# Patient Record
Sex: FEMALE | Race: BLACK OR AFRICAN AMERICAN | NOT HISPANIC OR LATINO | URBAN - METROPOLITAN AREA
[De-identification: names, ages, dates, MRNs, and addresses within clinical notes are randomized per-mention and may not be internally consistent; named-entity substitution may affect disease eponyms.]

---

## 2017-02-15 ENCOUNTER — INPATIENT (INPATIENT)
Facility: HOSPITAL | Age: 72
LOS: 1 days | Discharge: HOME | DRG: 248 | End: 2017-02-17
Attending: INTERNAL MEDICINE | Admitting: INTERNAL MEDICINE
Payer: MEDICARE

## 2017-02-15 VITALS
SYSTOLIC BLOOD PRESSURE: 256 MMHG | TEMPERATURE: 98 F | DIASTOLIC BLOOD PRESSURE: 153 MMHG | RESPIRATION RATE: 18 BRPM | HEIGHT: 66 IN | OXYGEN SATURATION: 98 % | HEART RATE: 114 BPM | WEIGHT: 169.98 LBS

## 2017-02-15 DIAGNOSIS — E78.5 HYPERLIPIDEMIA, UNSPECIFIED: ICD-10-CM

## 2017-02-15 DIAGNOSIS — Z72.0 TOBACCO USE: ICD-10-CM

## 2017-02-15 DIAGNOSIS — I16.1 HYPERTENSIVE EMERGENCY: ICD-10-CM

## 2017-02-15 DIAGNOSIS — Z98.890 OTHER SPECIFIED POSTPROCEDURAL STATES: Chronic | ICD-10-CM

## 2017-02-15 DIAGNOSIS — I21.3 ST ELEVATION (STEMI) MYOCARDIAL INFARCTION OF UNSPECIFIED SITE: ICD-10-CM

## 2017-02-15 DIAGNOSIS — I21.02 ST ELEVATION (STEMI) MYOCARDIAL INFARCTION INVOLVING LEFT ANTERIOR DESCENDING CORONARY ARTERY: ICD-10-CM

## 2017-02-15 LAB
ALBUMIN SERPL ELPH-MCNC: 4.1 G/DL — SIGNIFICANT CHANGE UP (ref 3.3–5.2)
ALP SERPL-CCNC: 98 U/L — SIGNIFICANT CHANGE UP (ref 40–120)
ALT FLD-CCNC: 12 U/L — SIGNIFICANT CHANGE UP
ANION GAP SERPL CALC-SCNC: 15 MMOL/L — SIGNIFICANT CHANGE UP (ref 5–17)
APTT BLD: 36.7 SEC — SIGNIFICANT CHANGE UP (ref 27.5–37.4)
AST SERPL-CCNC: 29 U/L — SIGNIFICANT CHANGE UP
BASOPHILS # BLD AUTO: 0 K/UL — SIGNIFICANT CHANGE UP (ref 0–0.2)
BASOPHILS NFR BLD AUTO: 1 % — SIGNIFICANT CHANGE UP (ref 0–2)
BILIRUB SERPL-MCNC: 0.6 MG/DL — SIGNIFICANT CHANGE UP (ref 0.4–2)
BLD GP AB SCN SERPL QL: SIGNIFICANT CHANGE UP
BUN SERPL-MCNC: 13 MG/DL — SIGNIFICANT CHANGE UP (ref 8–20)
CALCIUM SERPL-MCNC: 9.8 MG/DL — SIGNIFICANT CHANGE UP (ref 8.6–10.2)
CHLORIDE SERPL-SCNC: 101 MMOL/L — SIGNIFICANT CHANGE UP (ref 98–107)
CK SERPL-CCNC: 117 U/L — SIGNIFICANT CHANGE UP (ref 25–170)
CO2 SERPL-SCNC: 22 MMOL/L — SIGNIFICANT CHANGE UP (ref 22–29)
CREAT SERPL-MCNC: 1.22 MG/DL — SIGNIFICANT CHANGE UP (ref 0.5–1.3)
EOSINOPHIL # BLD AUTO: 0.2 K/UL — SIGNIFICANT CHANGE UP (ref 0–0.5)
EOSINOPHIL NFR BLD AUTO: 2 % — SIGNIFICANT CHANGE UP (ref 0–5)
GLUCOSE SERPL-MCNC: 104 MG/DL — SIGNIFICANT CHANGE UP (ref 70–115)
HCT VFR BLD CALC: 43.4 % — SIGNIFICANT CHANGE UP (ref 37–47)
HGB BLD-MCNC: 14.4 G/DL — SIGNIFICANT CHANGE UP (ref 12–16)
INR BLD: 1.06 RATIO — SIGNIFICANT CHANGE UP (ref 0.88–1.16)
LYMPHOCYTES # BLD AUTO: 26 % — SIGNIFICANT CHANGE UP (ref 20–55)
LYMPHOCYTES # BLD AUTO: 3.4 K/UL — SIGNIFICANT CHANGE UP (ref 1–4.8)
MAGNESIUM SERPL-MCNC: 2 MG/DL — SIGNIFICANT CHANGE UP (ref 1.8–2.5)
MCHC RBC-ENTMCNC: 28.7 PG — SIGNIFICANT CHANGE UP (ref 27–31)
MCHC RBC-ENTMCNC: 33.2 G/DL — SIGNIFICANT CHANGE UP (ref 32–36)
MCV RBC AUTO: 86.6 FL — SIGNIFICANT CHANGE UP (ref 81–99)
MONOCYTES # BLD AUTO: 1 K/UL — HIGH (ref 0–0.8)
MONOCYTES NFR BLD AUTO: 9 % — SIGNIFICANT CHANGE UP (ref 3–10)
NEUTROPHILS # BLD AUTO: 6.7 K/UL — SIGNIFICANT CHANGE UP (ref 1.8–8)
NEUTROPHILS NFR BLD AUTO: 59 % — SIGNIFICANT CHANGE UP (ref 37–73)
NT-PROBNP SERPL-SCNC: 6494 PG/ML — HIGH (ref 0–300)
PHOSPHATE SERPL-MCNC: 3.1 MG/DL — SIGNIFICANT CHANGE UP (ref 2.4–4.7)
PLATELET # BLD AUTO: 179 K/UL — SIGNIFICANT CHANGE UP (ref 150–400)
POTASSIUM SERPL-MCNC: 5.4 MMOL/L — HIGH (ref 3.5–5.3)
POTASSIUM SERPL-SCNC: 5.4 MMOL/L — HIGH (ref 3.5–5.3)
PROT SERPL-MCNC: 8.6 G/DL — SIGNIFICANT CHANGE UP (ref 6.6–8.7)
PROTHROM AB SERPL-ACNC: 11.7 SEC — SIGNIFICANT CHANGE UP (ref 10–13.1)
RBC # BLD: 5.01 M/UL — SIGNIFICANT CHANGE UP (ref 4.4–5.2)
RBC # FLD: 15.8 % — HIGH (ref 11–15.6)
SODIUM SERPL-SCNC: 138 MMOL/L — SIGNIFICANT CHANGE UP (ref 135–145)
TROPONIN T SERPL-MCNC: 0.16 NG/ML — CRITICAL HIGH (ref 0–0.06)
TYPE + AB SCN PNL BLD: SIGNIFICANT CHANGE UP
WBC # BLD: 11.9 K/UL — HIGH (ref 4.8–10.8)
WBC # FLD AUTO: 11.9 K/UL — HIGH (ref 4.8–10.8)

## 2017-02-15 PROCEDURE — 92941 PRQ TRLML REVSC TOT OCCL AMI: CPT | Mod: LD

## 2017-02-15 PROCEDURE — 93010 ELECTROCARDIOGRAM REPORT: CPT

## 2017-02-15 PROCEDURE — 99291 CRITICAL CARE FIRST HOUR: CPT

## 2017-02-15 PROCEDURE — 71010: CPT | Mod: 26

## 2017-02-15 PROCEDURE — 93458 L HRT ARTERY/VENTRICLE ANGIO: CPT | Mod: 26,XU

## 2017-02-15 RX ORDER — METOPROLOL TARTRATE 50 MG
25 TABLET ORAL
Qty: 0 | Refills: 0 | Status: DISCONTINUED | OUTPATIENT
Start: 2017-02-15 | End: 2017-02-17

## 2017-02-15 RX ORDER — BIVALIRUDIN 250 MG/1
1.75 INJECTION, POWDER, LYOPHILIZED, FOR SOLUTION INTRAVENOUS
Qty: 250 | Refills: 0 | Status: DISCONTINUED | OUTPATIENT
Start: 2017-02-15 | End: 2017-02-16

## 2017-02-15 RX ORDER — AMLODIPINE BESYLATE 2.5 MG/1
10 TABLET ORAL ONCE
Qty: 0 | Refills: 0 | Status: COMPLETED | OUTPATIENT
Start: 2017-02-15 | End: 2017-02-15

## 2017-02-15 RX ORDER — CLOPIDOGREL BISULFATE 75 MG/1
600 TABLET, FILM COATED ORAL ONCE
Qty: 0 | Refills: 0 | Status: COMPLETED | OUTPATIENT
Start: 2017-02-15 | End: 2017-02-15

## 2017-02-15 RX ORDER — SODIUM CHLORIDE 9 MG/ML
3 INJECTION INTRAMUSCULAR; INTRAVENOUS; SUBCUTANEOUS ONCE
Qty: 0 | Refills: 0 | Status: COMPLETED | OUTPATIENT
Start: 2017-02-15 | End: 2017-02-15

## 2017-02-15 RX ORDER — HYDRALAZINE HCL 50 MG
10 TABLET ORAL ONCE
Qty: 0 | Refills: 0 | Status: COMPLETED | OUTPATIENT
Start: 2017-02-15 | End: 2017-02-15

## 2017-02-15 RX ORDER — LISINOPRIL 2.5 MG/1
20 TABLET ORAL DAILY
Qty: 0 | Refills: 0 | Status: DISCONTINUED | OUTPATIENT
Start: 2017-02-16 | End: 2017-02-17

## 2017-02-15 RX ORDER — LISINOPRIL 2.5 MG/1
20 TABLET ORAL ONCE
Qty: 0 | Refills: 0 | Status: COMPLETED | OUTPATIENT
Start: 2017-02-15 | End: 2017-02-15

## 2017-02-15 RX ORDER — CLOPIDOGREL BISULFATE 75 MG/1
75 TABLET, FILM COATED ORAL DAILY
Qty: 0 | Refills: 0 | Status: DISCONTINUED | OUTPATIENT
Start: 2017-02-16 | End: 2017-02-17

## 2017-02-15 RX ORDER — AMLODIPINE BESYLATE 2.5 MG/1
10 TABLET ORAL DAILY
Qty: 0 | Refills: 0 | Status: DISCONTINUED | OUTPATIENT
Start: 2017-02-16 | End: 2017-02-17

## 2017-02-15 RX ORDER — ATORVASTATIN CALCIUM 80 MG/1
80 TABLET, FILM COATED ORAL AT BEDTIME
Qty: 0 | Refills: 0 | Status: DISCONTINUED | OUTPATIENT
Start: 2017-02-15 | End: 2017-02-17

## 2017-02-15 RX ORDER — NICOTINE POLACRILEX 2 MG
1 GUM BUCCAL DAILY
Qty: 0 | Refills: 0 | Status: DISCONTINUED | OUTPATIENT
Start: 2017-02-15 | End: 2017-02-17

## 2017-02-15 RX ORDER — ASPIRIN/CALCIUM CARB/MAGNESIUM 324 MG
325 TABLET ORAL DAILY
Qty: 0 | Refills: 0 | Status: DISCONTINUED | OUTPATIENT
Start: 2017-02-16 | End: 2017-02-17

## 2017-02-15 RX ORDER — NITROGLYCERIN 6.5 MG
40 CAPSULE, EXTENDED RELEASE ORAL
Qty: 50 | Refills: 0 | Status: DISCONTINUED | OUTPATIENT
Start: 2017-02-15 | End: 2017-02-16

## 2017-02-15 RX ADMIN — AMLODIPINE BESYLATE 10 MILLIGRAM(S): 2.5 TABLET ORAL at 18:14

## 2017-02-15 RX ADMIN — BIVALIRUDIN 27 MG/KG/HR: 250 INJECTION, POWDER, LYOPHILIZED, FOR SOLUTION INTRAVENOUS at 15:45

## 2017-02-15 RX ADMIN — CLOPIDOGREL BISULFATE 600 MILLIGRAM(S): 75 TABLET, FILM COATED ORAL at 21:16

## 2017-02-15 RX ADMIN — ATORVASTATIN CALCIUM 80 MILLIGRAM(S): 80 TABLET, FILM COATED ORAL at 22:52

## 2017-02-15 RX ADMIN — Medication 10 MILLIGRAM(S): at 13:44

## 2017-02-15 RX ADMIN — SODIUM CHLORIDE 3 MILLILITER(S): 9 INJECTION INTRAMUSCULAR; INTRAVENOUS; SUBCUTANEOUS at 13:48

## 2017-02-15 RX ADMIN — LISINOPRIL 20 MILLIGRAM(S): 2.5 TABLET ORAL at 18:14

## 2017-02-15 RX ADMIN — Medication 25 MILLIGRAM(S): at 18:14

## 2017-02-15 RX ADMIN — Medication 12 MICROGRAM(S)/MIN: at 15:28

## 2017-02-15 NOTE — DISCHARGE NOTE ADULT - MEDICATION SUMMARY - MEDICATIONS TO TAKE
I will START or STAY ON the medications listed below when I get home from the hospital:    aspirin 325 mg oral tablet  -- 1 tab(s) by mouth once a day  -- Indication: For coronary artery disease    lisinopril 10 mg oral tablet  -- 1 tab(s) by mouth once a day  -- Indication: For Hypertensive crisis    atorvastatin 40 mg oral tablet  -- 1 tab(s) by mouth once a day (at bedtime)  -- Avoid grapefruit and grapefruit juice while taking this medication.  Do not take this drug if you are pregnant.  It is very important that you take or use this exactly as directed.  Do not skip doses or discontinue unless directed by your doctor.  Obtain medical advice before taking any non-prescription drugs as some may affect the action of this medication.  Take with food or milk.    -- Indication: For Coronary artery disease    clopidogrel 75 mg oral tablet  -- 1 tab(s) by mouth once a day  -- Indication: For CAD    Toprol-XL 50 mg oral tablet, extended release  -- 1 tab(s) by mouth once a day  -- It is very important that you take or use this exactly as directed.  Do not skip doses or discontinue unless directed by your doctor.  May cause drowsiness.  Alcohol may intensify this effect.  Use care when operating dangerous machinery.  Some non-prescription drugs may aggravate your condition.  Read all labels carefully.  If a warning appears, check with your doctor before taking.  Swallow whole.  Do not crush.  Take with food or milk.  This drug may impair the ability to drive or operate machinery.  Use care until you become familiar with its effects.    -- Indication: For Hypertensive crisis    amLODIPine 10 mg oral tablet  -- 1 tab(s) by mouth once a day  -- Indication: For Hypertensive crisis    furosemide 20 mg oral tablet  -- 1 tab(s) by mouth every other day  -- Indication: For fluid overload I will START or STAY ON the medications listed below when I get home from the hospital:    [facility]  -- fax results to Dr Fuchs  [phone]  -- Indication: For blood test    aspirin 325 mg oral tablet  -- 1 tab(s) by mouth once a day  -- Indication: For coronary artery disease    lisinopril 10 mg oral tablet  -- 1 tab(s) by mouth once a day  -- Indication: For Hypertensive crisis    atorvastatin 40 mg oral tablet  -- 1 tab(s) by mouth once a day (at bedtime)  -- Avoid grapefruit and grapefruit juice while taking this medication.  Do not take this drug if you are pregnant.  It is very important that you take or use this exactly as directed.  Do not skip doses or discontinue unless directed by your doctor.  Obtain medical advice before taking any non-prescription drugs as some may affect the action of this medication.  Take with food or milk.    -- Indication: For Coronary artery disease    clopidogrel 75 mg oral tablet  -- 1 tab(s) by mouth once a day  -- Indication: For CAD    Toprol-XL 50 mg oral tablet, extended release  -- 1 tab(s) by mouth once a day  -- It is very important that you take or use this exactly as directed.  Do not skip doses or discontinue unless directed by your doctor.  May cause drowsiness.  Alcohol may intensify this effect.  Use care when operating dangerous machinery.  Some non-prescription drugs may aggravate your condition.  Read all labels carefully.  If a warning appears, check with your doctor before taking.  Swallow whole.  Do not crush.  Take with food or milk.  This drug may impair the ability to drive or operate machinery.  Use care until you become familiar with its effects.    -- Indication: For Hypertensive crisis    amLODIPine 10 mg oral tablet  -- 1 tab(s) by mouth once a day  -- Indication: For Hypertensive crisis    furosemide 20 mg oral tablet  -- 1 tab(s) by mouth every other day  -- Indication: For fluid overload    nicotine 7 mg/24 hr transdermal film, extended release  -- 1 each by transdermal patch once a day  -- Do not take this drug if you are pregnant.  For external use only.  It is very important that you take or use this exactly as directed.  Do not skip doses or discontinue unless directed by your doctor.  Remove old patch prior to applying a new patch.    -- Indication: For Ssmoking cessation

## 2017-02-15 NOTE — DISCHARGE NOTE ADULT - NS AS ACTIVITY OBS
Walking-Outdoors allowed/No Heavy lifting/straining/Stairs allowed/Walking-Indoors allowed/Showering allowed

## 2017-02-15 NOTE — DISCHARGE NOTE ADULT - PLAN OF CARE
Remain free of worsening CAD No heavy lifting, driving, sex, tub baths, swimming, or any activity that submerges the lower half of the body in water for 48 hours.  Limited walking and stairs for 48 hours.    Change the bandaid after 24 hours and every 24 hours after that.  Keep the puncture site dry and covered with a bandaid until a scab forms.    Observe the site frequently.  If bleeding or a large lump (the size of a golf ball or bigger) occurs lie flat, apply continuous direct pressure just above the puncture site for at least 10 minutes, and notify your physician immediately.  If the bleeding cannot be controlled, call 911 immediately for assistance.  Notify your physician of pain, swelling or any drainage.    Notify your physician immediately if coldness, numbness, discoloration or pain in your foot occurs.  Follow up with Dr. Fuchs in one to two weeks. take blood pressure medications as prescribed smoking cessation advised. Creatinine on discharge is 1.47.  repeat blood work within 1 week and follow up with either Dr Fuchs or Dr Conroy Remain free of chest pain No heavy lifting, driving, sex, tub baths, swimming, or any activity that submerges the lower half of the body in water for 48 hours.  Limited walking and stairs for 48 hours.    Change the bandaid after 24 hours and every 24 hours after that.  Keep the puncture site dry and covered with a bandaid until a scab forms.    Observe the site frequently.  If bleeding or a large lump (the size of a golf ball or bigger) occurs lie flat, apply continuous direct pressure just above the puncture site for at least 10 minutes, and notify your physician immediately.  If the bleeding cannot be controlled, call 911 immediately for assistance.  Notify your physician of pain, swelling or any drainage.    Notify your physician immediately if coldness, numbness, discoloration or pain in your foot occurs.  Follow up with Dr. Fuchs in one week with blood work

## 2017-02-15 NOTE — DISCHARGE NOTE ADULT - CARE PROVIDERS DIRECT ADDRESSES
,[email or web address],DirectAddress_Unknown ,[email or web address],DirectAddress_Unknown,DirectAddress_Unknown

## 2017-02-15 NOTE — DISCHARGE NOTE ADULT - PATIENT PORTAL LINK FT
“You can access the Follow[employer] Patient Portal, offered by [facility], by registering with the following website: [email or web address]”

## 2017-02-15 NOTE — DISCHARGE NOTE ADULT - OTHER SIGNIFICANT FINDINGS
echo:   Summary:   1. Normal left ventricular internal cavity size.   2. There is moderate concentric left ventricular hypertrophy.   3. Left ventricular ejection fraction, by visual estimation, is 50 to   55%.   4. Multiple left ventricular regional wall motion abnormalities exist.   See wall motion findings.   5. Mildly decreased segmental left ventricular systolic function.   6. Ischemic cardiomyopathy.   7. Spectral Doppler shows impaired relaxation pattern of left   ventricular myocardial filling (Grade I diastolic dysfunction).   8. Mild to moderately enlarged left atrium.   9. Normal right ventricular size and function.  10. Mild mitral annular calcification.  11. Mild to moderate mitral valve regurgitation.  12. Sclerotic aortic valve with normal opening.  13. There is no evidence of pericardial effusion.

## 2017-02-15 NOTE — PATIENT PROFILE ADULT. - VISION (WITH CORRECTIVE LENSES IF THE PATIENT USUALLY WEARS THEM):
has reading glasses/Normal vision: sees adequately in most situations; can see medication labels, newsprint

## 2017-02-15 NOTE — DISCHARGE NOTE ADULT - CARE PROVIDER_API CALL
Dwayne Fuchs), Cardiovascular Disease; Internal Medicine; Interventional Cardiology  [address] [place] [address]  Phone: [phone]  Fax: [phone] Dwayne Fuchs), Cardiovascular Disease; Internal Medicine; Interventional Cardiology  [address] [place] [address]  Phone: [phone]  Fax: [phone]    Sadiq Conroy (VICTOR HUGO), Medicine  Geriatric Medicine  [address] [place] [id number]  Phone: [phone]  Fax: [phone]

## 2017-02-15 NOTE — ED PROVIDER NOTE - CARE PLAN
Principal Discharge DX:	STEMI (ST elevation myocardial infarction)  Secondary Diagnosis:	Hypertensive emergency

## 2017-02-15 NOTE — DISCHARGE NOTE ADULT - HOSPITAL COURSE
72 yo F w/ hx HTN, stopped lisinopril long time ago due to weakness presents with CP, found to have hypertensive crisis and NSTEMI s/p cath BMS to LAD.  Diuresed a bit for hypertensive pulmonary edema and CR increased to 1.47.  Stable for discharge after discussion with cardiology.    repeat blood work in 1 week.  VSS afebrile, no acute complaints. feels well. no cp/sob.  ambulatory  RRR s1s2, mild bibasilar crackles., soft +BS , no edema. nonfocal neuro exam.   d/c planning 35 min.     SW to address home heating issue that may prevent patient from going home today. 70 yo F w/ hx HTN, stopped lisinopril long time ago due to weakness presents with CP, found to have hypertensive crisis and NSTEMI s/p cath BMS to LAD.  Diuresed a bit for hypertensive pulmonary edema and CR increased to 1.47.  Stable for discharge after discussion with cardiology.    repeat blood work in 1 week.  VSS afebrile, no acute complaints. feels well. no cp/sob.  ambulatory  RRR s1s2, mild bibasilar crackles., soft +BS , no edema. nonfocal neuro exam.   d/c planning 35 min.     acute systolic heart failure due to hypertensive disease as EF normal.     SW to address home heating issue that may prevent patient from going home today.

## 2017-02-15 NOTE — ED PROVIDER NOTE - OBJECTIVE STATEMENT
72 yo female presents for evaluation of recurrent chest pains with concerning EKG changes. Patient has had recurrent episodes of chest discomfort which has been waxing and waning in intensity. Patient was seen by her PMD and when symptoms began and told to come back for re-eval. She states that she has hx of HTN which she has been non-compliant with.

## 2017-02-15 NOTE — H&P CARDIOLOGY - HISTORY OF PRESENT ILLNESS
72 y/o female with h/o HTN and HPL with c/o midsternal chest "ache" on and off for the past 2 weeks sometimes feeling like 10/10 pain.  She went to see her PMD this am as a follow up visit and was sent to ER via ambulance with abnormal EKG and extremely elevated /160.  Upon arrival to ER patient chest pain 2/10 and BP elevated at 243/160.  EKG with LVH throughout and ST elevations V3 and V4.  Patient given IV hydralazine by ER staff and sent CCL for urgent LHC +/- PCI. 72 y/o female with h/o HTN and HPL with c/o midsternal chest "ache" on and off for the past 2 weeks sometimes feeling like 10/10 pain.  She went to see her PMD this am as a follow up visit and was sent to ER via ambulance with abnormal EKG and extremely elevated /160.  Upon arrival to ER patient chest pain 2/10 and BP elevated at 243/160.  EKG with LVH throughout and T wave abnormalities.  Patient given IV hydralazine by ER staff and sent CCL for urgent LHC +/- PCI. 72 y/o female with h/o HTN and HPL with c/o midsternal chest "ache" on and off for the past 2 weeks sometimes feeling like 10/10 pain.  She went to see her PMD this am as a follow up visit and was sent to ER via ambulance with abnormal EKG and extremely elevated /160.  Upon arrival to ER patient chest pain 2/10 and BP elevated at 243/160.  EKG with LVH throughout and T wave abnormalities.  Patient given IV hydralazine by ER staff and sent CCL for urgent LHC +/- PCI.    PMD--Dr. Conroy

## 2017-02-15 NOTE — ED PROVIDER NOTE - CRITICAL CARE PROVIDED
consultation with other physicians/interpretation of diagnostic studies/direct patient care (not related to procedure)

## 2017-02-15 NOTE — CONSULT NOTE ADULT - SUBJECTIVE AND OBJECTIVE BOX
Patient is a 71y old  Female who presents with a chief complaint of chest pain.    BRIEF HOSPITAL COURSE: 72 yo female, PMHx HTN, HLD, presents to ED for evaluation of chest pain. Chest pain substernal, intermittent pain described as "ache" x 2 weeks. Pain improved with leaning forward and Zantac, exacerbated by reaching with outstretched right arm. Pain not reproducible with palpation. Pt saw PMD for same pain approx 2 weeks ago, diagnosed with GERD. Pt has h/o stomach ulcer, but states this was not the same pain. Pain acutely worsened today to 10/10 pain, causing pt to seek evaluation. No associated SOB, diaphoresis, n/v, visual changes, headache. While in ED, EKG revealed ST elevations in V3-4, with T wave inversions in V5-6, trop with slight elevation. Pt was taken to cardiac cath, found to have 95% occlusion of LAD, stent x 1 placed. Pt currently denies chest pain, SOB, diaphoresis, n/v, visual changes, headache.    Events last 24 hours: Cardiac cath, stent x 1, nitroglycerine drip     PAST MEDICAL & SURGICAL HISTORY:  Hyperlipidemia  HTN (hypertension)  No pertinent past medical history  S/P exploratory laparotomy    Allergies    penicillin (Rash)    Intolerances      FAMILY HISTORY:  No pertinent family history in first degree relatives      Review of Systems:  CONSTITUTIONAL: No fever, chills, or fatigue  EYES: No eye pain, visual disturbances, or discharge  ENMT:  No difficulty hearing, tinnitus, vertigo; No sinus or throat pain  NECK: No pain or stiffness  RESPIRATORY: No cough, wheezing, chills or hemoptysis; No shortness of breath  CARDIOVASCULAR: +CHEST PAIN, palpitations, dizziness, or leg swelling  GASTROINTESTINAL: No abdominal or epigastric pain. No nausea, vomiting, or hematemesis; No diarrhea or constipation. No melena or hematochezia.  GENITOURINARY: No dysuria, frequency, hematuria, or incontinence  NEUROLOGICAL: No headaches, memory loss, loss of strength, numbness, or tremors  SKIN: No itching, burning, rashes, or lesions   MUSCULOSKELETAL: No joint pain or swelling; No muscle, back, or extremity pain  PSYCHIATRIC: No depression, anxiety, mood swings, or difficulty sleeping      Medications:    metoprolol 25milliGRAM(s) Oral two times a day  nitroglycerin  Infusion 40MICROgram(s)/Min IV Continuous <Continuous>          clopidogrel Tablet 600milliGRAM(s) Oral once  bivalirudin Infusion 1.751mG/kG/Hr IV Continuous <Continuous>  clopidogrel Tablet 600milliGRAM(s) Oral once        atorvastatin 80milliGRAM(s) Oral at bedtime          nicotine -   7 mG/24Hr(s) Patch 1patch Transdermal daily          ICU Vital Signs Last 24 Hrs  T(C): 36.6, Max: 36.8 (02-15 @ 13:32)  T(F): 97.9, Max: 98.2 (02-15 @ 13:32)  HR: 76 (76 - 114)  BP: 187/102 (154/96 - 256/153)  BP(mean): 138 (120 - 170)  ABP: --  ABP(mean): --  RR: 20 (14 - 27)  SpO2: 100% (91% - 100%)    Vital Signs Last 24 Hrs  T(C): 36.6, Max: 36.8 (02-15 @ 13:32)  T(F): 97.9, Max: 98.2 (02-15 @ 13:32)  HR: 76 (76 - 114)  BP: 187/102 (154/96 - 256/153)  BP(mean): 138 (120 - 170)  RR: 20 (14 - 27)  SpO2: 100% (91% - 100%)        I&O's Detail    I & Os for current day (as of 15 Feb 2017 19:12)  =============================================  IN:    Oral Fluid: 80 ml    nitroglycerin  Infusion: 72 ml    Total IN: 152 ml  ---------------------------------------------  OUT:    Voided: 200 ml    Total OUT: 200 ml  ---------------------------------------------  Total NET: -48 ml        LABS:                        14.4   11.9  )-----------( 179      ( 15 Feb 2017 13:46 )             43.4     15 Feb 2017 13:46    138    |  101    |  13.0   ----------------------------<  104    5.4     |  22.0   |  1.22     Ca    9.8        15 Feb 2017 13:46  Phos  3.1       15 Feb 2017 13:46  Mg     2.0       15 Feb 2017 13:46    TPro  8.6    /  Alb  4.1    /  TBili  0.6    /  DBili  x      /  AST  29     /  ALT  12     /  AlkPhos  98     15 Feb 2017 13:46      CARDIAC MARKERS ( 15 Feb 2017 13:46 )  x     / 0.16 ng/mL / 117 U/L / x     / x          CAPILLARY BLOOD GLUCOSE    PT/INR - ( 15 Feb 2017 13:46 )   PT: 11.7 sec;   INR: 1.06 ratio         PTT - ( 15 Feb 2017 13:46 )  PTT:36.7 sec    CULTURES:      Physical Examination:    General: No acute distress.  Pt resting comfortably on stretcher.    HEENT: Pupils equal, reactive to light.  Symmetric.    PULM: Clear to auscultation bilaterally, no significant sputum production    CVS: Chest wall nontender. Regular rate and rhythm, no murmurs, rubs, or gallops    ABD: Soft, nondistended, nontender, normoactive bowel sounds, no masses    EXT: RIGHT GROIN CATH SITE CLEAN, DRY, AND INTACT. NO HEMATOMA, REGION SOFT. +DP/PT PULSES BILATERALLY. No edema, nontender    SKIN: Warm and well perfused, no rashes noted.    NEURO: Alert, oriented, interactive, nonfocal      CRITICAL CARE TIME SPENT: 35 MINUTES Patient is a 71y old  Female who presents with a chief complaint of chest pain.    BRIEF HOSPITAL COURSE: 70 yo female, PMHx HTN, HLD, presents to ED for evaluation of chest pain. Chest pain substernal, intermittent pain described as "ache" x 2 weeks. Pain improved with leaning forward and Zantac, exacerbated by reaching with outstretched right arm. Pain not reproducible with palpation. Pt saw PMD for same pain approx 2 weeks ago, diagnosed with GERD. Pt has h/o stomach ulcer, but states this was not the same pain. Pain acutely worsened today to 10/10 pain, causing pt to seek evaluation. No associated SOB, diaphoresis, n/v, visual changes, headache. While in ED, EKG revealed ST elevations in V3-4, with T wave inversions in V5-6, trop with slight elevation. Pt was taken to cardiac cath, found to have 95% occlusion of LAD, stent x 1 placed. Pt currently denies chest pain, SOB, diaphoresis, n/v, visual changes, headache.    Events last 24 hours: Cardiac cath, stent x 1, nitroglycerine drip     PAST MEDICAL & SURGICAL HISTORY:  Hyperlipidemia  HTN (hypertension)  No pertinent past medical history  S/P exploratory laparotomy    Allergies    penicillin (Rash)    Intolerances      FAMILY HISTORY:  No pertinent family history in first degree relatives      Review of Systems:  CONSTITUTIONAL: No fever, chills, or fatigue  EYES: No eye pain, visual disturbances, or discharge  ENMT:  No difficulty hearing, tinnitus, vertigo; No sinus or throat pain  NECK: No pain or stiffness  RESPIRATORY: No cough, wheezing, chills or hemoptysis; No shortness of breath  CARDIOVASCULAR: +CHEST PAIN, palpitations, dizziness, or leg swelling  GASTROINTESTINAL: No abdominal or epigastric pain. No nausea, vomiting, or hematemesis; No diarrhea or constipation. No melena or hematochezia.  GENITOURINARY: No dysuria, frequency, hematuria, or incontinence  NEUROLOGICAL: No headaches, memory loss, loss of strength, numbness, or tremors  SKIN: No itching, burning, rashes, or lesions   MUSCULOSKELETAL: No joint pain or swelling; No muscle, back, or extremity pain  PSYCHIATRIC: No depression, anxiety, mood swings, or difficulty sleeping      Medications:    metoprolol 25milliGRAM(s) Oral two times a day  nitroglycerin  Infusion 40MICROgram(s)/Min IV Continuous <Continuous>          clopidogrel Tablet 600milliGRAM(s) Oral once  bivalirudin Infusion 1.751mG/kG/Hr IV Continuous <Continuous>  clopidogrel Tablet 600milliGRAM(s) Oral once        atorvastatin 80milliGRAM(s) Oral at bedtime          nicotine -   7 mG/24Hr(s) Patch 1patch Transdermal daily          ICU Vital Signs Last 24 Hrs  T(C): 36.6, Max: 36.8 (02-15 @ 13:32)  T(F): 97.9, Max: 98.2 (02-15 @ 13:32)  HR: 76 (76 - 114)  BP: 187/102 (154/96 - 256/153)  BP(mean): 138 (120 - 170)  ABP: --  ABP(mean): --  RR: 20 (14 - 27)  SpO2: 100% (91% - 100%)    Vital Signs Last 24 Hrs  T(C): 36.6, Max: 36.8 (02-15 @ 13:32)  T(F): 97.9, Max: 98.2 (02-15 @ 13:32)  HR: 76 (76 - 114)  BP: 187/102 (154/96 - 256/153)  BP(mean): 138 (120 - 170)  RR: 20 (14 - 27)  SpO2: 100% (91% - 100%)        I&O's Detail    I & Os for current day (as of 15 Feb 2017 19:12)  =============================================  IN:    Oral Fluid: 80 ml    nitroglycerin  Infusion: 72 ml    Total IN: 152 ml  ---------------------------------------------  OUT:    Voided: 200 ml    Total OUT: 200 ml  ---------------------------------------------  Total NET: -48 ml        LABS:                        14.4   11.9  )-----------( 179      ( 15 Feb 2017 13:46 )             43.4     15 Feb 2017 13:46    138    |  101    |  13.0   ----------------------------<  104    5.4     |  22.0   |  1.22     Ca    9.8        15 Feb 2017 13:46  Phos  3.1       15 Feb 2017 13:46  Mg     2.0       15 Feb 2017 13:46    TPro  8.6    /  Alb  4.1    /  TBili  0.6    /  DBili  x      /  AST  29     /  ALT  12     /  AlkPhos  98     15 Feb 2017 13:46      CARDIAC MARKERS ( 15 Feb 2017 13:46 )  x     / 0.16 ng/mL / 117 U/L / x     / x          CAPILLARY BLOOD GLUCOSE    PT/INR - ( 15 Feb 2017 13:46 )   PT: 11.7 sec;   INR: 1.06 ratio         PTT - ( 15 Feb 2017 13:46 )  PTT:36.7 sec    CULTURES:      Physical Examination:    General: No acute distress.  Pt resting comfortably on stretcher.    HEENT: Pupils equal, reactive to light.  Symmetric.    PULM: Clear to auscultation bilaterally, no significant sputum production    CVS: Chest wall nontender. Regular rate and rhythm, no murmurs, rubs, or gallops    ABD: Soft, nondistended, nontender, normoactive bowel sounds, no masses    EXT: RIGHT GROIN CATH SITE CLEAN, DRY, AND INTACT. NO HEMATOMA, REGION SOFT. +DP/PT PULSES BILATERALLY. No edema, nontender    SKIN: Warm and well perfused, no rashes noted.    NEURO: Alert, oriented, interactive, nonfocal      CRITICAL CARE TIME SPENT: 45 MINUTES

## 2017-02-15 NOTE — PROGRESS NOTE ADULT - SUBJECTIVE AND OBJECTIVE BOX
Patient is a 71y old  Female who presents with a chief complaint of     BRIEF HOSPITAL COURSE: 71 yof with hypertensive crisis with CP EKG changes went cath with stent to LAD stent    Events last 24 hours: back from cath lab    PAST MEDICAL & SURGICAL HISTORY:  Hyperlipidemia  HTN (hypertension)  No pertinent past medical history  S/P exploratory laparotomy      Review of Systems:  CONSTITUTIONAL: No fever, chills, or fatigue  EYES: No eye pain, visual disturbances, or discharge  ENMT:  No difficulty hearing, tinnitus, vertigo; No sinus or throat pain  NECK: No pain or stiffness  RESPIRATORY: No cough, wheezing, chills or hemoptysis; mildly SOB  CARDIOVASCULAR: No chest pain, palpitations, dizziness, or leg swelling  GASTROINTESTINAL: No abdominal or epigastric pain. No nausea, vomiting, or hematemesis; No diarrhea or constipation. No melena or hematochezia.  GENITOURINARY: No dysuria, frequency, hematuria, or incontinence  NEUROLOGICAL: No headaches, memory loss, loss of strength, numbness, or tremors  SKIN: No itching, burning, rashes, or lesions   MUSCULOSKELETAL: No joint pain or swelling; No muscle, back, or extremity pain  PSYCHIATRIC: No depression, anxiety, mood swings, or difficulty sleeping      Medications:    amLODIPine   Tablet 10milliGRAM(s) Oral once  lisinopril 20milliGRAM(s) Oral once  metoprolol 25milliGRAM(s) Oral two times a day  nitroglycerin  Infusion 40MICROgram(s)/Min IV Continuous <Continuous>    clopidogrel Tablet 600milliGRAM(s) Oral once  bivalirudin Infusion 1.751mG/kG/Hr IV Continuous <Continuous>  clopidogrel Tablet 600milliGRAM(s) Oral once    atorvastatin 80milliGRAM(s) Oral at bedtime      ICU Vital Signs Last 24 Hrs  T(C): 36.8, Max: 36.8 (02-15 @ 13:32)  T(F): 98.2, Max: 98.2 (02-15 @ 13:32)  HR: 79 (79 - 114)  BP: 198/102 (182/112 - 256/153)  BP(mean): 170 (170 - 170)  ABP: --  ABP(mean): --  RR: 16 (14 - 18)  SpO2: 100% (97% - 100%)          I&O's Detail        LABS:                        14.4   11.9  )-----------( 179      ( 15 Feb 2017 13:46 )             43.4     15 Feb 2017 13:46    138    |  101    |  13.0   ----------------------------<  104    5.4     |  22.0   |  1.22     Ca    9.8        15 Feb 2017 13:46  Phos  3.1       15 Feb 2017 13:46  Mg     2.0       15 Feb 2017 13:46    TPro  8.6    /  Alb  4.1    /  TBili  0.6    /  DBili  x      /  AST  29     /  ALT  12     /  AlkPhos  98     15 Feb 2017 13:46      CARDIAC MARKERS ( 15 Feb 2017 13:46 )  x     / 0.16 ng/mL / 117 U/L / x     / x          CAPILLARY BLOOD GLUCOSE    PT/INR - ( 15 Feb 2017 13:46 )   PT: 11.7 sec;   INR: 1.06 ratio         PTT - ( 15 Feb 2017 13:46 )  PTT:36.7 sec    CULTURES:      Physical Examination:    General: No acute distress.  Alert, oriented, interactive, nonfocal    HEENT: Pupils equal, reactive to light.  Symmetric.    PULM: Clear to auscultation bilaterally, no significant sputum production    CVS: Regular rate and rhythm, no murmurs, rubs, or gallops    ABD: Soft, nondistended, nontender, normoactive bowel sounds, no masses    EXT: No edema, nontender, cath site c/d/i    SKIN: Warm and well perfused, no rashes noted.

## 2017-02-15 NOTE — DISCHARGE NOTE ADULT - CARE PLAN
Goal:	Remain free of worsening CAD  Instructions for follow-up, activity and diet:	No heavy lifting, driving, sex, tub baths, swimming, or any activity that submerges the lower half of the body in water for 48 hours.  Limited walking and stairs for 48 hours.    Change the bandaid after 24 hours and every 24 hours after that.  Keep the puncture site dry and covered with a bandaid until a scab forms.    Observe the site frequently.  If bleeding or a large lump (the size of a golf ball or bigger) occurs lie flat, apply continuous direct pressure just above the puncture site for at least 10 minutes, and notify your physician immediately.  If the bleeding cannot be controlled, call 911 immediately for assistance.  Notify your physician of pain, swelling or any drainage.    Notify your physician immediately if coldness, numbness, discoloration or pain in your foot occurs.  Follow up with Dr. Fuchs in one to two weeks. Principal Discharge DX:	NSTEMI (non-ST elevated myocardial infarction)  Goal:	Remain free of chest pain  Instructions for follow-up, activity and diet:	No heavy lifting, driving, sex, tub baths, swimming, or any activity that submerges the lower half of the body in water for 48 hours.  Limited walking and stairs for 48 hours.    Change the bandaid after 24 hours and every 24 hours after that.  Keep the puncture site dry and covered with a bandaid until a scab forms.    Observe the site frequently.  If bleeding or a large lump (the size of a golf ball or bigger) occurs lie flat, apply continuous direct pressure just above the puncture site for at least 10 minutes, and notify your physician immediately.  If the bleeding cannot be controlled, call 911 immediately for assistance.  Notify your physician of pain, swelling or any drainage.    Notify your physician immediately if coldness, numbness, discoloration or pain in your foot occurs.  Follow up with Dr. Fuchs in one week with blood work  Secondary Diagnosis:	Hypertensive crisis  Instructions for follow-up, activity and diet:	take blood pressure medications as prescribed  Secondary Diagnosis:	Tobacco abuse  Instructions for follow-up, activity and diet:	smoking cessation advised.  Secondary Diagnosis:	LENY (acute kidney injury)  Instructions for follow-up, activity and diet:	Creatinine on discharge is 1.47.  repeat blood work within 1 week and follow up with either Dr Fuchs or Dr Conroy

## 2017-02-15 NOTE — PROGRESS NOTE ADULT - SUBJECTIVE AND OBJECTIVE BOX
Cardiology NP note;    -SEE H & P    -s/p urgent cath and BMS x 1 to pLAD  -RFA angioseal site benign without hematoma/bleeding; no Bruit; + Right PP  -Post PCI EKG pending  -VS: /96 HR 95 RR 16 Pox 99% O2  -Trop 0.16    A/P: 72 y/o female with h/o HTN and HPL who came to ER today via ambulance with c/o Chest Pain on and off over past 2 weeks with pain reaching up to 10/10 at times and hypertensive crisis with /160.  On arrival to ER EKG with LVH throughout and ST/T abnormalities laterally.  Patient sent urgently to CCL and now s/p BMS x 1 to pLAD and IV labetolol and SL NTG in lab for BP management.  -Admit to ICU--+NSTEMI  -Monitor for EKG changes and BP management with IV Tridil  -Meds: ADD amlodipine 10/lisinopril 20/lipitor 80/plavix/ASA  -Benefits of ASA and Plavix emphasized  -Lifestyle mods/diet/activity/meds discussed with patient verbal understanding  -Discussed with Dr. Fuchs and ICU staff

## 2017-02-16 DIAGNOSIS — I50.21 ACUTE SYSTOLIC (CONGESTIVE) HEART FAILURE: ICD-10-CM

## 2017-02-16 DIAGNOSIS — I16.9 HYPERTENSIVE CRISIS, UNSPECIFIED: ICD-10-CM

## 2017-02-16 DIAGNOSIS — I21.4 NON-ST ELEVATION (NSTEMI) MYOCARDIAL INFARCTION: ICD-10-CM

## 2017-02-16 DIAGNOSIS — I50.20 UNSPECIFIED SYSTOLIC (CONGESTIVE) HEART FAILURE: ICD-10-CM

## 2017-02-16 LAB
ANION GAP SERPL CALC-SCNC: 12 MMOL/L — SIGNIFICANT CHANGE UP (ref 5–17)
ANION GAP SERPL CALC-SCNC: 12 MMOL/L — SIGNIFICANT CHANGE UP (ref 5–17)
BASOPHILS # BLD AUTO: 0 K/UL — SIGNIFICANT CHANGE UP (ref 0–0.2)
BASOPHILS NFR BLD AUTO: 0.4 % — SIGNIFICANT CHANGE UP (ref 0–2)
BUN SERPL-MCNC: 15 MG/DL — SIGNIFICANT CHANGE UP (ref 8–20)
BUN SERPL-MCNC: 16 MG/DL — SIGNIFICANT CHANGE UP (ref 8–20)
CALCIUM SERPL-MCNC: 10.1 MG/DL — SIGNIFICANT CHANGE UP (ref 8.6–10.2)
CALCIUM SERPL-MCNC: 9.6 MG/DL — SIGNIFICANT CHANGE UP (ref 8.6–10.2)
CHLORIDE SERPL-SCNC: 96 MMOL/L — LOW (ref 98–107)
CHLORIDE SERPL-SCNC: 98 MMOL/L — SIGNIFICANT CHANGE UP (ref 98–107)
CHOLEST SERPL-MCNC: 207 MG/DL — HIGH (ref 110–199)
CK SERPL-CCNC: 105 U/L — SIGNIFICANT CHANGE UP (ref 25–170)
CO2 SERPL-SCNC: 25 MMOL/L — SIGNIFICANT CHANGE UP (ref 22–29)
CO2 SERPL-SCNC: 25 MMOL/L — SIGNIFICANT CHANGE UP (ref 22–29)
CREAT SERPL-MCNC: 1.25 MG/DL — SIGNIFICANT CHANGE UP (ref 0.5–1.3)
CREAT SERPL-MCNC: 1.32 MG/DL — HIGH (ref 0.5–1.3)
EOSINOPHIL # BLD AUTO: 0.2 K/UL — SIGNIFICANT CHANGE UP (ref 0–0.5)
EOSINOPHIL NFR BLD AUTO: 2.2 % — SIGNIFICANT CHANGE UP (ref 0–6)
GLUCOSE SERPL-MCNC: 101 MG/DL — SIGNIFICANT CHANGE UP (ref 70–115)
GLUCOSE SERPL-MCNC: 104 MG/DL — SIGNIFICANT CHANGE UP (ref 70–115)
HBA1C BLD-MCNC: 5.2 % — SIGNIFICANT CHANGE UP (ref 4–5.6)
HCT VFR BLD CALC: 34.9 % — LOW (ref 37–47)
HDLC SERPL-MCNC: 84 MG/DL — SIGNIFICANT CHANGE UP
HGB BLD-MCNC: 11.3 G/DL — LOW (ref 12–16)
LIPID PNL WITH DIRECT LDL SERPL: 107 MG/DL — SIGNIFICANT CHANGE UP
LYMPHOCYTES # BLD AUTO: 2.1 K/UL — SIGNIFICANT CHANGE UP (ref 1–4.8)
LYMPHOCYTES # BLD AUTO: 20.6 % — SIGNIFICANT CHANGE UP (ref 20–55)
MAGNESIUM SERPL-MCNC: 1.8 MG/DL — SIGNIFICANT CHANGE UP (ref 1.8–2.5)
MCHC RBC-ENTMCNC: 27.8 PG — SIGNIFICANT CHANGE UP (ref 27–31)
MCHC RBC-ENTMCNC: 32.4 G/DL — SIGNIFICANT CHANGE UP (ref 32–36)
MCV RBC AUTO: 86 FL — SIGNIFICANT CHANGE UP (ref 81–99)
MONOCYTES # BLD AUTO: 1.2 K/UL — HIGH (ref 0–0.8)
MONOCYTES NFR BLD AUTO: 11.7 % — HIGH (ref 3–10)
NEUTROPHILS # BLD AUTO: 6.7 K/UL — SIGNIFICANT CHANGE UP (ref 1.8–8)
NEUTROPHILS NFR BLD AUTO: 64.8 % — SIGNIFICANT CHANGE UP (ref 37–73)
PLATELET # BLD AUTO: 184 K/UL — SIGNIFICANT CHANGE UP (ref 150–400)
POTASSIUM SERPL-MCNC: 3.3 MMOL/L — LOW (ref 3.5–5.3)
POTASSIUM SERPL-MCNC: 4.6 MMOL/L — SIGNIFICANT CHANGE UP (ref 3.5–5.3)
POTASSIUM SERPL-SCNC: 3.3 MMOL/L — LOW (ref 3.5–5.3)
POTASSIUM SERPL-SCNC: 4.6 MMOL/L — SIGNIFICANT CHANGE UP (ref 3.5–5.3)
RBC # BLD: 4.06 M/UL — LOW (ref 4.4–5.2)
RBC # FLD: 15.7 % — HIGH (ref 11–15.6)
SODIUM SERPL-SCNC: 133 MMOL/L — LOW (ref 135–145)
SODIUM SERPL-SCNC: 135 MMOL/L — SIGNIFICANT CHANGE UP (ref 135–145)
TOTAL CHOLESTEROL/HDL RATIO MEASUREMENT: 2 RATIO — LOW (ref 3.3–7.1)
TRIGL SERPL-MCNC: 79 MG/DL — SIGNIFICANT CHANGE UP (ref 10–200)
TROPONIN T SERPL-MCNC: 0.27 NG/ML — CRITICAL HIGH (ref 0–0.06)
TROPONIN T SERPL-MCNC: 0.39 NG/ML — CRITICAL HIGH (ref 0–0.06)
WBC # BLD: 10.3 K/UL — SIGNIFICANT CHANGE UP (ref 4.8–10.8)
WBC # FLD AUTO: 10.3 K/UL — SIGNIFICANT CHANGE UP (ref 4.8–10.8)

## 2017-02-16 PROCEDURE — 93010 ELECTROCARDIOGRAM REPORT: CPT

## 2017-02-16 PROCEDURE — 99233 SBSQ HOSP IP/OBS HIGH 50: CPT

## 2017-02-16 PROCEDURE — 99232 SBSQ HOSP IP/OBS MODERATE 35: CPT

## 2017-02-16 RX ORDER — FUROSEMIDE 40 MG
20 TABLET ORAL DAILY
Qty: 0 | Refills: 0 | Status: DISCONTINUED | OUTPATIENT
Start: 2017-02-17 | End: 2017-02-17

## 2017-02-16 RX ORDER — POTASSIUM CHLORIDE 20 MEQ
40 PACKET (EA) ORAL EVERY 4 HOURS
Qty: 0 | Refills: 0 | Status: COMPLETED | OUTPATIENT
Start: 2017-02-16 | End: 2017-02-16

## 2017-02-16 RX ORDER — FUROSEMIDE 40 MG
20 TABLET ORAL ONCE
Qty: 0 | Refills: 0 | Status: COMPLETED | OUTPATIENT
Start: 2017-02-16 | End: 2017-02-16

## 2017-02-16 RX ORDER — ACETAMINOPHEN 500 MG
650 TABLET ORAL ONCE
Qty: 0 | Refills: 0 | Status: COMPLETED | OUTPATIENT
Start: 2017-02-16 | End: 2017-02-16

## 2017-02-16 RX ADMIN — ATORVASTATIN CALCIUM 80 MILLIGRAM(S): 80 TABLET, FILM COATED ORAL at 22:31

## 2017-02-16 RX ADMIN — Medication 25 MILLIGRAM(S): at 18:35

## 2017-02-16 RX ADMIN — Medication 40 MILLIEQUIVALENT(S): at 08:52

## 2017-02-16 RX ADMIN — Medication 1 PATCH: at 06:24

## 2017-02-16 RX ADMIN — AMLODIPINE BESYLATE 10 MILLIGRAM(S): 2.5 TABLET ORAL at 06:23

## 2017-02-16 RX ADMIN — Medication 325 MILLIGRAM(S): at 11:43

## 2017-02-16 RX ADMIN — Medication 650 MILLIGRAM(S): at 03:51

## 2017-02-16 RX ADMIN — LISINOPRIL 20 MILLIGRAM(S): 2.5 TABLET ORAL at 06:23

## 2017-02-16 RX ADMIN — Medication 650 MILLIGRAM(S): at 00:48

## 2017-02-16 RX ADMIN — Medication 40 MILLIEQUIVALENT(S): at 11:43

## 2017-02-16 RX ADMIN — CLOPIDOGREL BISULFATE 75 MILLIGRAM(S): 75 TABLET, FILM COATED ORAL at 11:43

## 2017-02-16 RX ADMIN — Medication 20 MILLIGRAM(S): at 08:51

## 2017-02-16 RX ADMIN — Medication 25 MILLIGRAM(S): at 06:23

## 2017-02-16 NOTE — PROGRESS NOTE ADULT - PROBLEM SELECTOR PLAN 1
Now stable with po meds. Continue.
nitro gtt, norvasc, b-blocker, ACEI, repeat troponins, EKG, TTE in am
resolved   c/w aspirin/plavix/statin   cardiology following

## 2017-02-16 NOTE — PROGRESS NOTE ADULT - SUBJECTIVE AND OBJECTIVE BOX
CC: chest pain    INTERVAL HISTORY:Stable. No further symptoms. Improved BP.     MEDICATIONS  (STANDING):  amLODIPine   Tablet 10milliGRAM(s) Oral daily  lisinopril 20milliGRAM(s) Oral daily  atorvastatin 80milliGRAM(s) Oral at bedtime  metoprolol 25milliGRAM(s) Oral two times a day  aspirin 325milliGRAM(s) Oral daily  clopidogrel Tablet 75milliGRAM(s) Oral daily  nicotine -   7 mG/24Hr(s) Patch 1patch Transdermal daily    ROS: All others negative     PHYSICAL EXAM:  T(C): 36.9, Max: 37.1 (02-15 @ 23:00)  HR: 79 (69 - 114)  BP: 125/60 (125/60 - 256/153)  RR: 24 (14 - 36)  SpO2: 100% (91% - 100%)  Wt(kg): --  I&O's Summary  I & Os for 24h ending 16 Feb 2017 07:00  =============================================  IN: 534 ml / OUT: 1400 ml / NET: -866 ml    I & Os for current day (as of 16 Feb 2017 12:38)  =============================================  IN: 525 ml / OUT: 550 ml / NET: -25 ml      Appearance: Normal	  HEENT:   Normal oral mucosa, PERRL, EOMI	  Lymphatic: No lymphadenopathy  Cardiovascular: Normal S1 S2, No JVD, No murmurs, No edema  Respiratory: Lungs clear to auscultation	  Psychiatry: A & O x 3, Mood & affect appropriate  Gastrointestinal:  Soft, Non-tender, + BS	  Skin: No rashes, No ecchymoses, No cyanosis  Neurologic: Non-focal  Extremities: Normal range of motion, No clubbing, cyanosis or edema  Vascular: Peripheral pulses palpable 2+ bilaterally    TELEMETRY: 	      LABS:	 	                        11.3   10.3  )-----------( 184      ( 16 Feb 2017 06:46 )             34.9     16 Feb 2017 06:46    135    |  98     |  15.0   ----------------------------<  104    3.3     |  25.0   |  1.25     Ca    9.6        16 Feb 2017 06:46  Phos  3.1       15 Feb 2017 13:46  Mg     1.8       16 Feb 2017 07:46    TPro  8.6    /  Alb  4.1    /  TBili  0.6    /  DBili  x      /  AST  29     /  ALT  12     /  AlkPhos  98     15 Feb 2017 13:46

## 2017-02-16 NOTE — PROGRESS NOTE ADULT - SUBJECTIVE AND OBJECTIVE BOX
Subjective:  71y  Female s/p LHC/RHC which showed VENTRICLES: EF estimated was 40 %.  CORONARY VESSELS: The coronary circulation is right dominant.  LM:   --  LM: Normal.  LAD:--  Proximal LAD: There was a 90 % stenosis.  CX:   --  Circumflex: Angiography showed mild atherosclerosis with no flow  limiting lesions.  RCA:   --  Mid RCA: There was a 40 % stenosis.    Severe systemic hypertension. EKGs most likely due  to hypertensive heart disease and NOT STEMI. Lesion noted in proximal LAD  with active pain and positive biomarkers. PCI performed with BMS due to  significant non-compliance.      PAST MEDICAL & SURGICAL HISTORY:  Hyperlipidemia  HTN (hypertension)  No pertinent past medical history  S/P exploratory laparotomy    penicillin (Rash)    FAMILY HISTORY:  No pertinent family history in first degree relatives    MEDICATIONS  (STANDING):  amLODIPine   Tablet 10milliGRAM(s) Oral daily  lisinopril 20milliGRAM(s) Oral daily  atorvastatin 80milliGRAM(s) Oral at bedtime  metoprolol 25milliGRAM(s) Oral two times a day  aspirin 325milliGRAM(s) Oral daily  clopidogrel Tablet 75milliGRAM(s) Oral daily  bivalirudin Infusion 1.751mG/kG/Hr IV Continuous <Continuous>  nitroglycerin  Infusion 40MICROgram(s)/Min IV Continuous <Continuous>  nicotine -   7 mG/24Hr(s) Patch 1patch Transdermal daily  potassium chloride    Tablet ER 40milliEquivalent(s) Oral every 4 hours      General: No fatigue, no fevers/chills  Respiratory: No dyspnea, no cough, no wheeze  CV: No chest pain, no palpitations  Abd: No nausea  Neuro: No headache, no dizziness      Objective:  Vital Signs Last 24 Hrs  T(C): 37.1, Max: 37.1 (02-15 @ 23:00)  T(F): 98.7, Max: 98.7 (02-15 @ 23:00)  HR: 72 (69 - 114)  BP: 136/74 (128/69 - 256/153)  BP(mean): 93 (93 - 170)  RR: 22 (14 - 36)  SpO2: 100% (91% - 100%)    EKG:     NEURO: A & O x 3, no focal neurologic deficits  PULM:  CTA B/L No W/R/R  CARD: RRR, +S1, +S2, No M/R/G  ABD: ND, +BS, NT, no masses  EXT:   PULSES:    Labs:                        11.3   10.3  )-----------( 184      ( 16 Feb 2017 06:46 )             34.9     16 Feb 2017 06:46    135    |  98     |  15.0   ----------------------------<  104    3.3     |  25.0   |  1.25     Ca    9.6        16 Feb 2017 06:46  Phos  3.1       15 Feb 2017 13:46  Mg     2.0       15 Feb 2017 13:46    TPro  8.6    /  Alb  4.1    /  TBili  0.6    /  DBili  x      /  AST  29     /  ALT  12     /  AlkPhos  98     15 Feb 2017 13:46    PT/INR - ( 15 Feb 2017 13:46 )   PT: 11.7 sec;   INR: 1.06 ratio         PTT - ( 15 Feb 2017 13:46 )  PTT:36.7 sec    EKG: NSR LVH Q v1-3 lat TWI      A/P:  s/p LHC/RHC/LHC with PCI: PTCA/ Atherectomy/Rotational Atherectomy/BMS/BOOM  -  Aspirin/Plavix*/Statin/BB/ACE. No Statin Prescribed due to ____________.   -  Importance of DAPT discussed   -  Continue current medications  -  Remove right/left femoral artery sheath/ right radial band at:   -  Bedrest x *  hours; notify provider with site complications  -  Groin or wrist management discussed with patient  -  Lifestyle modification, diet and activity discussed; pt verbalized understanding  -  Smoking cessation discussed/Non-smoker   -  Admit to   -  Follow up as an outpatient with   -  Labs, EKG in am  -  Probable discharge in am Subjective:  71y  Female s/p LHC/RHC which showed VENTRICLES: EF estimated was 40 %.  CORONARY VESSELS: The coronary circulation is right dominant.  LM:   --  LM: Normal.  LAD:--  Proximal LAD: There was a 90 % stenosis.  CX:   --  Circumflex: Angiography showed mild atherosclerosis with no flow  limiting lesions.  RCA:   --  Mid RCA: There was a 40 % stenosis.    Severe systemic hypertension. EKGs most likely due  to hypertensive heart disease and NOT STEMI. Lesion noted in proximal LAD  with active pain and positive biomarkers. PCI performed with BMS due to  significant non-compliance.    Troponin trending down. RFA angioseal without complications. Reports gas pain overnight, now resolved. Does have headache since tridil gtt started. Resting comfortably in bed    PAST MEDICAL & SURGICAL HISTORY:  Hyperlipidemia  HTN (hypertension)  No pertinent past medical history  S/P exploratory laparotomy    penicillin (Rash)    FAMILY HISTORY:  No pertinent family history in first degree relatives    MEDICATIONS  (STANDING):  amLODIPine   Tablet 10milliGRAM(s) Oral daily  lisinopril 20milliGRAM(s) Oral daily  atorvastatin 80milliGRAM(s) Oral at bedtime  metoprolol 25milliGRAM(s) Oral two times a day  aspirin 325milliGRAM(s) Oral daily  clopidogrel Tablet 75milliGRAM(s) Oral daily  bivalirudin Infusion 1.751mG/kG/Hr IV Continuous <Continuous>  nitroglycerin  Infusion 40MICROgram(s)/Min IV Continuous <Continuous>  nicotine -   7 mG/24Hr(s) Patch 1patch Transdermal daily  potassium chloride    Tablet ER 40milliEquivalent(s) Oral every 4 hours      General: No fatigue, no fevers/chills  Respiratory: No dyspnea, no cough, no wheeze  CV: No chest pain, no palpitations  Abd: No nausea  Neuro: headache, no dizziness      Objective:  Vital Signs Last 24 Hrs  T(C): 37.1, Max: 37.1 (02-15 @ 23:00)  T(F): 98.7, Max: 98.7 (02-15 @ 23:00)  HR: 72 (69 - 114)  BP: 136/74 (128/69 - 256/153)  BP(mean): 93 (93 - 170)  RR: 22 (14 - 36)  SpO2: 100% (91% - 100%)      NEURO: A & O x 3, no focal neurologic deficits  PULM:  RML LLL rhonci  CARD: RRR, +S1, +S2, No M/R/G  ABD: ND, +BS, NT, no masses  EXT: R groin without hematoma/bleed  PULSES: +DP    Labs:                        11.3   10.3  )-----------( 184      ( 16 Feb 2017 06:46 )             34.9     16 Feb 2017 06:46    135    |  98     |  15.0   ----------------------------<  104    3.3     |  25.0   |  1.25     Ca    9.6        16 Feb 2017 06:46  Phos  3.1       15 Feb 2017 13:46  Mg     2.0       15 Feb 2017 13:46    TPro  8.6    /  Alb  4.1    /  TBili  0.6    /  DBili  x      /  AST  29     /  ALT  12     /  AlkPhos  98     15 Feb 2017 13:46    PT/INR - ( 15 Feb 2017 13:46 )   PT: 11.7 sec;   INR: 1.06 ratio         PTT - ( 15 Feb 2017 13:46 )  PTT:36.7 sec    EKG: NSR LVH Q v1-3 diffuse ST-T abnormalities      A/P: CAD s/p LHC with BMS to LAD  -  on Aspirin, Plavix, Statin, BB, ACE, CCB  -  Importance of DAPT discussed   -  Groin management discussed with patient  Hypertensive urgency  - on oral antihypertensives, nitro gtt  Hypokalemia - being repleted  TTE pending today  Tobacco dependence  -  Smoking cessation discussed  Continue with plans as per Cardiology, ICU team Subjective:  71y  Female s/p LHC/RHC which showed VENTRICLES: EF estimated was 40 %.  CORONARY VESSELS: The coronary circulation is right dominant.  LM:   --  LM: Normal.  LAD:--  Proximal LAD: There was a 90 % stenosis.  CX:   --  Circumflex: Angiography showed mild atherosclerosis with no flow  limiting lesions.  RCA:   --  Mid RCA: There was a 40 % stenosis.    Severe systemic hypertension. EKGs most likely due  to hypertensive heart disease and NOT STEMI. Lesion noted in proximal LAD  with active pain and positive biomarkers. PCI performed with BMS due to  significant non-compliance.    Troponin trending down. RFA angioseal without complications. Reports gas pain overnight, now resolved. Does have headache since tridil gtt started. Resting comfortably in bed    PAST MEDICAL & SURGICAL HISTORY:  Hyperlipidemia  HTN (hypertension)  No pertinent past medical history  S/P exploratory laparotomy    penicillin (Rash)    FAMILY HISTORY:  No pertinent family history in first degree relatives    MEDICATIONS  (STANDING):  amLODIPine   Tablet 10milliGRAM(s) Oral daily  lisinopril 20milliGRAM(s) Oral daily  atorvastatin 80milliGRAM(s) Oral at bedtime  metoprolol 25milliGRAM(s) Oral two times a day  aspirin 325milliGRAM(s) Oral daily  clopidogrel Tablet 75milliGRAM(s) Oral daily  bivalirudin Infusion 1.751mG/kG/Hr IV Continuous <Continuous>  nitroglycerin  Infusion 40MICROgram(s)/Min IV Continuous <Continuous>  nicotine -   7 mG/24Hr(s) Patch 1patch Transdermal daily  potassium chloride    Tablet ER 40milliEquivalent(s) Oral every 4 hours      General: No fatigue, no fevers/chills  Respiratory: No dyspnea, no cough, no wheeze  CV: No chest pain, no palpitations  Abd: No nausea  Neuro: headache, no dizziness      Objective:  Vital Signs Last 24 Hrs  T(C): 37.1, Max: 37.1 (02-15 @ 23:00)  T(F): 98.7, Max: 98.7 (02-15 @ 23:00)  HR: 72 (69 - 114)  BP: 136/74 (128/69 - 256/153)  BP(mean): 93 (93 - 170)  RR: 22 (14 - 36)  SpO2: 100% (91% - 100%)      NEURO: A & O x 3, no focal neurologic deficits  PULM:  RML LLL rhonci  CARD: RRR, +S1, +S2, No M/R/G  ABD: ND, +BS, NT, no masses  EXT: R groin without hematoma/bleed  PULSES: +DP    Labs:                        11.3   10.3  )-----------( 184      ( 16 Feb 2017 06:46 )             34.9     16 Feb 2017 06:46    135    |  98     |  15.0   ----------------------------<  104    3.3     |  25.0   |  1.25     Ca    9.6        16 Feb 2017 06:46  Phos  3.1       15 Feb 2017 13:46  Mg     2.0       15 Feb 2017 13:46    TPro  8.6    /  Alb  4.1    /  TBili  0.6    /  DBili  x      /  AST  29     /  ALT  12     /  AlkPhos  98     15 Feb 2017 13:46    PT/INR - ( 15 Feb 2017 13:46 )   PT: 11.7 sec;   INR: 1.06 ratio         PTT - ( 15 Feb 2017 13:46 )  PTT:36.7 sec    EKG: NSR LVH Q v1-3 diffuse ST-T abnormalities  Tele: no events      A/P: CAD s/p LHC with BMS to LAD  -  on Aspirin, Plavix, Statin, BB, ACE, CCB  -  Importance of DAPT discussed   -  Groin management discussed with patient  Hypertensive urgency  - on oral antihypertensives, nitro gtt  Hypokalemia - being repleted  TTE pending today  Tobacco dependence  -  Smoking cessation discussed  Continue with plans as per Cardiology, ICU team

## 2017-02-16 NOTE — PROGRESS NOTE ADULT - PROBLEM SELECTOR PLAN 3
TTE to assess LV function. If depressed EF, will plan to add coreg 3.125 mg po bid instead of lopressor.   If stable, consider dc planning for tomorrow.
IV lasix x 1 today   PO lasix per  echo pending
nicotine patch

## 2017-02-16 NOTE — PROGRESS NOTE ADULT - SUBJECTIVE AND OBJECTIVE BOX
Patient is a 71y old  Female who presents with a chief complaint of   PAST MEDICAL & SURGICAL HISTORY:  Hyperlipidemia  HTN (hypertension)  No pertinent past medical history  S/P exploratory laparotomy      BRIEF HOSPITAL COURSE:   Admit with chest pain SOB  EKG abnormality    Events last 24 hours:  S/P PCI to 95% LAD lesion  with BMS    Review of Systems:    No CP SOB improved        All other ROS are negative.  Medications:    amLODIPine   Tablet 10milliGRAM(s) Oral daily  lisinopril 20milliGRAM(s) Oral daily  metoprolol 25milliGRAM(s) Oral two times a day  furosemide   Injectable 20milliGRAM(s) IV Push once      aspirin 325milliGRAM(s) Oral daily      clopidogrel Tablet 75milliGRAM(s) Oral daily        atorvastatin 80milliGRAM(s) Oral at bedtime    potassium chloride    Tablet ER 40milliEquivalent(s) Oral every 4 hours        nicotine -   7 mG/24Hr(s) Patch 1patch Transdermal daily      ICU Vital Signs Last 24 Hrs  T(C): 37.1, Max: 37.1 (02-15 @ 23:00)  T(F): 98.7, Max: 98.7 (02-15 @ 23:00)  HR: 81 (69 - 114)  BP: 136/76 (128/69 - 256/153)  BP(mean): 101 (93 - 170)  ABP: --  ABP(mean): --  RR: 30 (14 - 36)  SpO2: 100% (91% - 100%)    Physical Examination:    General:   No distress    HEENT:   no JVD    PULM: bilateral crackles up to mid back    CVS: s1 s2 reg    ABD:  soft NT    EXT: no edema     SKIN: Warm no rash    Neuro:  Non focal      LABS:                        11.3   10.3  )-----------( 184      ( 16 Feb 2017 06:46 )             34.9     16 Feb 2017 06:46    135    |  98     |  15.0   ----------------------------<  104    3.3     |  25.0   |  1.25     Ca    9.6        16 Feb 2017 06:46  Phos  3.1       15 Feb 2017 13:46  Mg     1.8       16 Feb 2017 07:46    TPro  8.6    /  Alb  4.1    /  TBili  0.6    /  DBili  x      /  AST  29     /  ALT  12     /  AlkPhos  98     15 Feb 2017 13:46      CARDIAC MARKERS ( 16 Feb 2017 07:46 )  x     / x     / 105 U/L / x     / x      CARDIAC MARKERS ( 16 Feb 2017 06:46 )  x     / 0.27 ng/mL / x     / x     / x      CARDIAC MARKERS ( 15 Feb 2017 23:56 )  x     / 0.39 ng/mL / x     / x     / x      CARDIAC MARKERS ( 15 Feb 2017 13:46 )  x     / 0.16 ng/mL / 117 U/L / x     / x          CAPILLARY BLOOD GLUCOSE    PT/INR - ( 15 Feb 2017 13:46 )   PT: 11.7 sec;   INR: 1.06 ratio         PTT - ( 15 Feb 2017 13:46 )  PTT:36.7 sec    CULTURES:      RADIOLOGY/IMAGING/ECHO    CXR CHF

## 2017-02-16 NOTE — PROGRESS NOTE ADULT - SUBJECTIVE AND OBJECTIVE BOX
CC: CP    ICU transfer    H&P/labs/imaging and intervention reviewed.    patient seen in ICU, has no acute complaints but has difficulty laying flat due to SOB.    MEDICATIONS  (STANDING):  amLODIPine   Tablet 10milliGRAM(s) Oral daily  lisinopril 20milliGRAM(s) Oral daily  atorvastatin 80milliGRAM(s) Oral at bedtime  metoprolol 25milliGRAM(s) Oral two times a day  aspirin 325milliGRAM(s) Oral daily  clopidogrel Tablet 75milliGRAM(s) Oral daily  nicotine -   7 mG/24Hr(s) Patch 1patch Transdermal daily  potassium chloride    Tablet ER 40milliEquivalent(s) Oral every 4 hours  furosemide   Injectable 20milliGRAM(s) IV Push once    MEDICATIONS  (PRN):      Allergies    penicillin (Rash)    Intolerances        REVIEW OF SYSTEMS:    CONSTITUTIONAL: No fever  RESPIRATORY:shortness of breath  CARDIOVASCULAR: No chest pain, palpitations  GASTROINTESTINAL: No abdominal or epigastric pain  NEUROLOGICAL: No headaches  MISCELLANEOUS:    Vital Signs Last 24 Hrs  T(C): 37.1, Max: 37.1 (02-15 @ 23:00)  T(F): 98.7, Max: 98.7 (02-15 @ 23:00)  HR: 81 (69 - 114)  BP: 136/76 (128/69 - 256/153)  BP(mean): 101 (93 - 170)  RR: 30 (14 - 36)  SpO2: 100% (91% - 100%)    PHYSICAL EXAM:    GENERAL: NAD  CHEST/LUNG: bibasilar crackles  HEART: Regular rate and rhythm; S1 S2; no murmurs noted  ABDOMEN: Soft, Nontender, Nondistended; Bowel sounds present  EXTREMITIES: no edema.  NERVOUS SYSTEM:  Alert & Oriented X3, Good concentration; Motor Strength 5/5 B/L upper and lower extremities  PSYCH: normal mood, appropriate response.    LABS:                        11.3   10.3  )-----------( 184      ( 16 Feb 2017 06:46 )             34.9     16 Feb 2017 06:46    135    |  98     |  15.0   ----------------------------<  104    3.3     |  25.0   |  1.25     Ca    9.6        16 Feb 2017 06:46  Phos  3.1       15 Feb 2017 13:46  Mg     1.8       16 Feb 2017 07:46    TPro  8.6    /  Alb  4.1    /  TBili  0.6    /  DBili  x      /  AST  29     /  ALT  12     /  AlkPhos  98     15 Feb 2017 13:46    PT/INR - ( 15 Feb 2017 13:46 )   PT: 11.7 sec;   INR: 1.06 ratio         PTT - ( 15 Feb 2017 13:46 )  PTT:36.7 sec      CAPILLARY BLOOD GLUCOSE        RADIOLOGY & ADDITIONAL TESTS:

## 2017-02-16 NOTE — PROGRESS NOTE ADULT - PROBLEM SELECTOR PLAN 2
Patient with normal CPK with abnormal troponins. Possible that this was hypertensive crisis and significant CAD noted during angiogram. For now dx will be NSTEMI. Improving EKG.
stable, off nitro drip  c/w lisinopril/ lopressor/ norvasc
statin

## 2017-02-16 NOTE — PROGRESS NOTE ADULT - ASSESSMENT
71F hx HTN HLD active smoker.  Admit with CP SOB s/p PCI to 95% LAD lesion EF was 40% in cath lab she was hypertensive on admission and in acute pulmonary edema.   BP now controlled CP free no arrythmia reported overnight.  Hypokalemia repleted.  Extra diuretic given.   Trop peaked at .3  Awaiting echo   On ace statin BB  DAPT  smoking cessation, and DAPT counselling provided.  Case endorsed to Dr Adames will transfer to telemetry.

## 2017-02-16 NOTE — PROGRESS NOTE ADULT - ASSESSMENT
72 yo F w/ hx HTN, stopped lisinopril long time ago due to weakness presents with CP, found to have hypertensive crisis and NSTEMI s/p cath BMS to LAD

## 2017-02-17 VITALS
DIASTOLIC BLOOD PRESSURE: 60 MMHG | OXYGEN SATURATION: 99 % | SYSTOLIC BLOOD PRESSURE: 105 MMHG | HEART RATE: 69 BPM | TEMPERATURE: 98 F | RESPIRATION RATE: 16 BRPM

## 2017-02-17 LAB
ANION GAP SERPL CALC-SCNC: 13 MMOL/L — SIGNIFICANT CHANGE UP (ref 5–17)
BUN SERPL-MCNC: 17 MG/DL — SIGNIFICANT CHANGE UP (ref 8–20)
CALCIUM SERPL-MCNC: 10.1 MG/DL — SIGNIFICANT CHANGE UP (ref 8.6–10.2)
CHLORIDE SERPL-SCNC: 100 MMOL/L — SIGNIFICANT CHANGE UP (ref 98–107)
CO2 SERPL-SCNC: 22 MMOL/L — SIGNIFICANT CHANGE UP (ref 22–29)
CREAT SERPL-MCNC: 1.47 MG/DL — HIGH (ref 0.5–1.3)
GLUCOSE SERPL-MCNC: 100 MG/DL — SIGNIFICANT CHANGE UP (ref 70–115)
MAGNESIUM SERPL-MCNC: 1.9 MG/DL — SIGNIFICANT CHANGE UP (ref 1.8–2.5)
POTASSIUM SERPL-MCNC: 3.9 MMOL/L — SIGNIFICANT CHANGE UP (ref 3.5–5.3)
POTASSIUM SERPL-SCNC: 3.9 MMOL/L — SIGNIFICANT CHANGE UP (ref 3.5–5.3)
SODIUM SERPL-SCNC: 135 MMOL/L — SIGNIFICANT CHANGE UP (ref 135–145)

## 2017-02-17 PROCEDURE — 80048 BASIC METABOLIC PNL TOTAL CA: CPT

## 2017-02-17 PROCEDURE — 80061 LIPID PANEL: CPT

## 2017-02-17 PROCEDURE — 99239 HOSP IP/OBS DSCHRG MGMT >30: CPT

## 2017-02-17 PROCEDURE — C1760: CPT

## 2017-02-17 PROCEDURE — 93005 ELECTROCARDIOGRAM TRACING: CPT

## 2017-02-17 PROCEDURE — 93306 TTE W/DOPPLER COMPLETE: CPT

## 2017-02-17 PROCEDURE — C1725: CPT

## 2017-02-17 PROCEDURE — 86850 RBC ANTIBODY SCREEN: CPT

## 2017-02-17 PROCEDURE — 83735 ASSAY OF MAGNESIUM: CPT

## 2017-02-17 PROCEDURE — C1894: CPT

## 2017-02-17 PROCEDURE — 83880 ASSAY OF NATRIURETIC PEPTIDE: CPT

## 2017-02-17 PROCEDURE — 85610 PROTHROMBIN TIME: CPT

## 2017-02-17 PROCEDURE — 85027 COMPLETE CBC AUTOMATED: CPT

## 2017-02-17 PROCEDURE — 71045 X-RAY EXAM CHEST 1 VIEW: CPT

## 2017-02-17 PROCEDURE — 86900 BLOOD TYPING SEROLOGIC ABO: CPT

## 2017-02-17 PROCEDURE — 85730 THROMBOPLASTIN TIME PARTIAL: CPT

## 2017-02-17 PROCEDURE — 92941 PRQ TRLML REVSC TOT OCCL AMI: CPT | Mod: LD

## 2017-02-17 PROCEDURE — 83036 HEMOGLOBIN GLYCOSYLATED A1C: CPT

## 2017-02-17 PROCEDURE — 36415 COLL VENOUS BLD VENIPUNCTURE: CPT

## 2017-02-17 PROCEDURE — 84100 ASSAY OF PHOSPHORUS: CPT

## 2017-02-17 PROCEDURE — 99291 CRITICAL CARE FIRST HOUR: CPT | Mod: 25

## 2017-02-17 PROCEDURE — 86901 BLOOD TYPING SEROLOGIC RH(D): CPT

## 2017-02-17 PROCEDURE — C1769: CPT

## 2017-02-17 PROCEDURE — C1876: CPT

## 2017-02-17 PROCEDURE — 82550 ASSAY OF CK (CPK): CPT

## 2017-02-17 PROCEDURE — 93458 L HRT ARTERY/VENTRICLE ANGIO: CPT | Mod: XU

## 2017-02-17 PROCEDURE — C1887: CPT

## 2017-02-17 PROCEDURE — 84484 ASSAY OF TROPONIN QUANT: CPT

## 2017-02-17 PROCEDURE — 80053 COMPREHEN METABOLIC PANEL: CPT

## 2017-02-17 RX ORDER — NICOTINE POLACRILEX 2 MG
1 GUM BUCCAL
Qty: 10 | Refills: 0 | OUTPATIENT
Start: 2017-02-17 | End: 2017-02-27

## 2017-02-17 RX ORDER — FUROSEMIDE 40 MG
1 TABLET ORAL
Qty: 15 | Refills: 0 | OUTPATIENT
Start: 2017-02-17 | End: 2017-03-19

## 2017-02-17 RX ORDER — ASPIRIN/CALCIUM CARB/MAGNESIUM 324 MG
1 TABLET ORAL
Qty: 30 | Refills: 0 | OUTPATIENT
Start: 2017-02-17 | End: 2017-03-19

## 2017-02-17 RX ORDER — LISINOPRIL 2.5 MG/1
1 TABLET ORAL
Qty: 0 | Refills: 0 | COMMUNITY

## 2017-02-17 RX ORDER — AMLODIPINE BESYLATE 2.5 MG/1
1 TABLET ORAL
Qty: 30 | Refills: 0 | OUTPATIENT
Start: 2017-02-17 | End: 2017-03-19

## 2017-02-17 RX ORDER — CLOPIDOGREL BISULFATE 75 MG/1
1 TABLET, FILM COATED ORAL
Qty: 30 | Refills: 0 | OUTPATIENT
Start: 2017-02-17 | End: 2017-03-19

## 2017-02-17 RX ORDER — METOPROLOL TARTRATE 50 MG
1 TABLET ORAL
Qty: 30 | Refills: 0 | OUTPATIENT
Start: 2017-02-17 | End: 2017-03-19

## 2017-02-17 RX ORDER — LISINOPRIL 2.5 MG/1
1 TABLET ORAL
Qty: 30 | Refills: 0 | OUTPATIENT
Start: 2017-02-17 | End: 2017-03-19

## 2017-02-17 RX ORDER — ATORVASTATIN CALCIUM 80 MG/1
1 TABLET, FILM COATED ORAL
Qty: 30 | Refills: 0 | OUTPATIENT
Start: 2017-02-17 | End: 2017-03-19

## 2017-02-17 RX ADMIN — AMLODIPINE BESYLATE 10 MILLIGRAM(S): 2.5 TABLET ORAL at 06:00

## 2017-02-17 RX ADMIN — CLOPIDOGREL BISULFATE 75 MILLIGRAM(S): 75 TABLET, FILM COATED ORAL at 11:22

## 2017-02-17 RX ADMIN — Medication 325 MILLIGRAM(S): at 11:22

## 2017-02-17 RX ADMIN — Medication 20 MILLIGRAM(S): at 06:00

## 2017-02-17 RX ADMIN — Medication 1 APPLICATORFUL: at 11:24

## 2017-02-17 RX ADMIN — Medication 25 MILLIGRAM(S): at 16:34

## 2017-02-17 RX ADMIN — Medication 1 PATCH: at 06:00

## 2017-02-17 RX ADMIN — Medication 1 PATCH: at 11:23

## 2017-02-17 RX ADMIN — Medication 25 MILLIGRAM(S): at 06:00

## 2017-02-17 RX ADMIN — LISINOPRIL 20 MILLIGRAM(S): 2.5 TABLET ORAL at 06:00

## 2017-02-23 PROBLEM — Z00.00 ENCOUNTER FOR PREVENTIVE HEALTH EXAMINATION: Status: ACTIVE | Noted: 2017-02-23

## 2017-03-07 ENCOUNTER — APPOINTMENT (OUTPATIENT)
Dept: CARDIOLOGY | Facility: CLINIC | Age: 72
End: 2017-03-07

## 2017-03-07 VITALS
OXYGEN SATURATION: 100 % | DIASTOLIC BLOOD PRESSURE: 66 MMHG | HEART RATE: 54 BPM | SYSTOLIC BLOOD PRESSURE: 116 MMHG | WEIGHT: 166 LBS | HEIGHT: 71 IN | BODY MASS INDEX: 23.24 KG/M2

## 2017-03-07 VITALS — DIASTOLIC BLOOD PRESSURE: 82 MMHG | SYSTOLIC BLOOD PRESSURE: 133 MMHG

## 2017-03-07 DIAGNOSIS — E78.5 HYPERLIPIDEMIA, UNSPECIFIED: ICD-10-CM

## 2017-03-07 DIAGNOSIS — I50.21 ACUTE SYSTOLIC (CONGESTIVE) HEART FAILURE: ICD-10-CM

## 2017-03-07 DIAGNOSIS — I25.10 ATHEROSCLEROTIC HEART DISEASE OF NATIVE CORONARY ARTERY W/OUT ANGINA PECTORIS: ICD-10-CM

## 2017-03-07 DIAGNOSIS — Z95.5 PRESENCE OF CORONARY ANGIOPLASTY IMPLANT AND GRAFT: ICD-10-CM

## 2017-03-07 DIAGNOSIS — R10.30 LOWER ABDOMINAL PAIN, UNSPECIFIED: ICD-10-CM

## 2017-03-07 DIAGNOSIS — F17.200 NICOTINE DEPENDENCE, UNSPECIFIED, UNCOMPLICATED: ICD-10-CM

## 2017-03-07 DIAGNOSIS — Z09 ENCOUNTER FOR FOLLOW-UP EXAMINATION AFTER COMPLETED TREATMENT FOR CONDITIONS OTHER THAN MALIGNANT NEOPLASM: ICD-10-CM

## 2017-03-07 DIAGNOSIS — I10 ESSENTIAL (PRIMARY) HYPERTENSION: ICD-10-CM

## 2017-03-08 PROBLEM — I10 BENIGN ESSENTIAL HYPERTENSION: Status: ACTIVE | Noted: 2017-03-07

## 2017-03-08 PROBLEM — E78.5 HYPERLIPIDEMIA: Status: ACTIVE | Noted: 2017-03-07

## 2017-03-08 PROBLEM — Z95.5 PRESENCE OF STENT IN LAD CORONARY ARTERY: Status: ACTIVE | Noted: 2017-03-07

## 2017-03-08 PROBLEM — I25.10 CAD (CORONARY ARTERY DISEASE): Status: ACTIVE | Noted: 2017-03-07

## 2017-03-08 PROBLEM — Z09 HOSPITAL DISCHARGE FOLLOW-UP: Status: ACTIVE | Noted: 2017-03-07

## 2017-03-12 ENCOUNTER — NON-APPOINTMENT (OUTPATIENT)
Age: 72
End: 2017-03-12

## 2017-03-12 PROBLEM — I50.21 ACUTE SYSTOLIC CONGESTIVE HEART FAILURE: Status: ACTIVE | Noted: 2017-03-12

## 2017-03-13 ENCOUNTER — MEDICATION RENEWAL (OUTPATIENT)
Age: 72
End: 2017-03-13

## 2017-03-13 RX ORDER — AMLODIPINE BESYLATE 10 MG/1
10 TABLET ORAL DAILY
Qty: 30 | Refills: 3 | Status: ACTIVE | COMMUNITY
Start: 2017-03-12 | End: 1900-01-01

## 2017-03-13 RX ORDER — LISINOPRIL 10 MG/1
10 TABLET ORAL
Qty: 30 | Refills: 3 | Status: ACTIVE | COMMUNITY
Start: 2017-03-12 | End: 1900-01-01

## 2017-03-13 RX ORDER — ATORVASTATIN CALCIUM 40 MG/1
40 TABLET, FILM COATED ORAL
Qty: 30 | Refills: 5 | Status: ACTIVE | COMMUNITY
Start: 2017-03-12 | End: 1900-01-01

## 2017-03-13 RX ORDER — METOPROLOL SUCCINATE 50 MG/1
50 TABLET, EXTENDED RELEASE ORAL DAILY
Qty: 1 | Refills: 3 | Status: ACTIVE | COMMUNITY
Start: 2017-03-12 | End: 1900-01-01

## 2017-03-15 ENCOUNTER — APPOINTMENT (OUTPATIENT)
Dept: CARDIOLOGY | Facility: CLINIC | Age: 72
End: 2017-03-15

## 2017-03-22 ENCOUNTER — APPOINTMENT (OUTPATIENT)
Dept: CARDIOLOGY | Facility: CLINIC | Age: 72
End: 2017-03-22

## 2017-04-19 RX ORDER — CLOPIDOGREL 75 MG/1
75 TABLET, FILM COATED ORAL DAILY
Qty: 1 | Refills: 1 | Status: ACTIVE | COMMUNITY
Start: 2017-03-12 | End: 1900-01-01

## 2017-04-19 RX ORDER — ASPIRIN 325 MG/1
325 TABLET, FILM COATED ORAL DAILY
Qty: 30 | Refills: 0 | Status: ACTIVE | COMMUNITY
Start: 2017-03-12 | End: 1900-01-01

## 2017-04-19 RX ORDER — FUROSEMIDE 20 MG/1
20 TABLET ORAL
Qty: 15 | Refills: 0 | Status: ACTIVE | COMMUNITY
Start: 2017-03-12 | End: 1900-01-01

## 2017-04-25 ENCOUNTER — APPOINTMENT (OUTPATIENT)
Dept: CARDIOLOGY | Facility: CLINIC | Age: 72
End: 2017-04-25

## 2017-05-02 ENCOUNTER — APPOINTMENT (OUTPATIENT)
Dept: CARDIOLOGY | Facility: CLINIC | Age: 72
End: 2017-05-02

## 2019-07-12 ENCOUNTER — HOSPITAL ENCOUNTER (EMERGENCY)
Dept: HOSPITAL 5 - ED | Age: 74
LOS: 1 days | Discharge: HOME | End: 2019-07-13
Payer: COMMERCIAL
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
## 2019-07-12 DIAGNOSIS — F12.10: ICD-10-CM
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
## 2019-07-12 DIAGNOSIS — I50.9: ICD-10-CM

## 2019-07-12 DIAGNOSIS — F17.200: ICD-10-CM
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
## 2019-07-12 DIAGNOSIS — I11.0: ICD-10-CM

## 2019-07-12 DIAGNOSIS — S80.11XA: ICD-10-CM
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
## 2019-07-12 DIAGNOSIS — S80.01XA: ICD-10-CM

## 2019-07-12 DIAGNOSIS — S83.91XA: Primary | ICD-10-CM
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
## 2019-07-13 VITALS — DIASTOLIC BLOOD PRESSURE: 102 MMHG | SYSTOLIC BLOOD PRESSURE: 197 MMHG
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
## 2022-12-09 NOTE — INPATIENT CERTIFICATION FOR MEDICARE PATIENTS - IN ORDER TO MEET MEDICARE REQUIREMENTS.
In order to meet Medicare requirements, the clinical documentation must support the information cited in the admission order.  Please be sure to provide detailed and clear documentation about the following in the admitting note/history and physical: PAST MEDICAL HISTORY:  Asthma     Chest pain

## 2023-01-01 NOTE — ED PROVIDER NOTE - CARDIAC, MLM
34
Normal rate, regular rhythm.  Heart sounds S1, S2.  No murmurs, rubs or gallops. Significant hypertension

## 2024-04-02 NOTE — PROVIDER CONTACT NOTE (CRITICAL VALUE NOTIFICATION) - DATE AND TIME:
-Last renal panel 9/2023 with Cr 1.74, eGFR 39  -Previously saw nephrologist Dr. Melendez, last visit 2019 with baseline Cr about 1.7  -Not able to follow up with nephrology outpatient as he is unable to leave home  -Avoid nephrotoxic medications and consider renal dosing   16-Feb-2017 03:09 16-Feb-2017 00:39

## 2024-10-18 NOTE — PATIENT PROFILE ADULT. - ARE SIGNIFICANT INDICATORS COMPLETE.
4 Eyes Skin Assessment     NAME:  Paul Henderson  YOB: 1957  MEDICAL RECORD NUMBER:  [id number]    The patient is being assessed for  Admission    I agree that at least one RN has performed a thorough Head to Toe Skin Assessment on the patient. ALL assessment sites listed below have been assessed.      Areas assessed by both nurses:    Head, Face, Ears, Shoulders, Back, Chest, Arms, Elbows, Hands, Sacrum. Buttock, Coccyx, Ischium, and Legs. Feet and Heels        Does the Patient have a Wound? Yes wound(s) were present on assessment. LDA wound assessment was Initiated and completed by RN  old open stage two open area on [facility]       Nikos Prevention initiated by RN: Yes  Wound Care Orders initiated by RN: Yes    Pressure Injury (Stage 3,4, Unstageable, DTI, NWPT, and Complex wounds) if present, place Wound referral order by RN under : Yes    New Ostomies, if present place, Ostomy referral order under : No     Nurse 1 eSignature: Electronically signed by Juancarlos Escobar RN on 10/18/24 at 5:54 PM EDT    **SHARE this note so that the co-signing nurse can place an eSignature**    Nurse 2 eSignature: Electronically signed by Livia Brooks RN on 10/23/24 at 6:18 AM EDT    No Yes

## 2025-05-06 NOTE — CONSULT NOTE ADULT - ASSESSMENT
72 yo female, HTN, DM, smoker, presents to ED for evaluation of chest pain, found to be in hypertensive emergency with EKG changes and STEMI. Cardiac cath performed, stent x 1 to LAD.   1. STEMI: Likely secondary to multiple risk factors, including HTN and smoking, and hypertensive emergency. Patient is status post cardiac catheterization with stent x 1 to LAD for 95% occlusion. Pt on ASA, Plavix. Will monitor patient per cardiology recommendations and ACS protocols, trend troponins, monitor groin site. Aspirin, Beta Blocker, Ace Inhibitor, and Statin therapy initiated. Will titrate nitroglycerine drip and antihypertensives for BP control. Will  on smoking cessation.   2. Hypertensive Emergency: Pt hypertensive 250's/150's with chest pain and EKG changes, troponin elevations. Will initiate nitroglycerine drip and oral antihypertensives for BP control. Will  on low sodium diet and smoking cessation.  3. Hyperlipidemia: Will initiate statin therapy.  4. Tobacco abuse: Will provide smoking cessation education and nicotine patch as needed.
0